# Patient Record
Sex: MALE | Race: BLACK OR AFRICAN AMERICAN | NOT HISPANIC OR LATINO | URBAN - METROPOLITAN AREA
[De-identification: names, ages, dates, MRNs, and addresses within clinical notes are randomized per-mention and may not be internally consistent; named-entity substitution may affect disease eponyms.]

---

## 2017-07-05 ENCOUNTER — EMERGENCY (EMERGENCY)
Facility: HOSPITAL | Age: 27
LOS: 1 days | Discharge: HOME | End: 2017-07-05
Attending: EMERGENCY MEDICINE | Admitting: EMERGENCY MEDICINE
Payer: SELF-PAY

## 2017-07-05 VITALS
OXYGEN SATURATION: 100 % | HEART RATE: 64 BPM | DIASTOLIC BLOOD PRESSURE: 91 MMHG | SYSTOLIC BLOOD PRESSURE: 142 MMHG | TEMPERATURE: 98 F | RESPIRATION RATE: 17 BRPM

## 2017-07-05 VITALS
TEMPERATURE: 99 F | SYSTOLIC BLOOD PRESSURE: 12 MMHG | HEART RATE: 78 BPM | OXYGEN SATURATION: 99 % | DIASTOLIC BLOOD PRESSURE: 76 MMHG | RESPIRATION RATE: 16 BRPM

## 2017-07-05 LAB
ALBUMIN SERPL ELPH-MCNC: 4.8 G/DL — SIGNIFICANT CHANGE UP (ref 3.3–5)
ALP SERPL-CCNC: 72 U/L — SIGNIFICANT CHANGE UP (ref 40–120)
ALT FLD-CCNC: 25 U/L RC — SIGNIFICANT CHANGE UP (ref 10–45)
ANION GAP SERPL CALC-SCNC: 14 MMOL/L — SIGNIFICANT CHANGE UP (ref 5–17)
APPEARANCE UR: CLEAR — SIGNIFICANT CHANGE UP
AST SERPL-CCNC: 22 U/L — SIGNIFICANT CHANGE UP (ref 10–40)
BACTERIA # UR AUTO: ABNORMAL /HPF
BASOPHILS # BLD AUTO: 0.1 K/UL — SIGNIFICANT CHANGE UP (ref 0–0.2)
BASOPHILS NFR BLD AUTO: 1.2 % — SIGNIFICANT CHANGE UP (ref 0–2)
BILIRUB SERPL-MCNC: 1 MG/DL — SIGNIFICANT CHANGE UP (ref 0.2–1.2)
BILIRUB UR-MCNC: NEGATIVE — SIGNIFICANT CHANGE UP
BUN SERPL-MCNC: 13 MG/DL — SIGNIFICANT CHANGE UP (ref 7–23)
CALCIUM SERPL-MCNC: 8.9 MG/DL — SIGNIFICANT CHANGE UP (ref 8.4–10.5)
CHLORIDE SERPL-SCNC: 105 MMOL/L — SIGNIFICANT CHANGE UP (ref 96–108)
CO2 SERPL-SCNC: 23 MMOL/L — SIGNIFICANT CHANGE UP (ref 22–31)
COLOR SPEC: YELLOW — SIGNIFICANT CHANGE UP
CREAT SERPL-MCNC: 0.94 MG/DL — SIGNIFICANT CHANGE UP (ref 0.5–1.3)
DIFF PNL FLD: NEGATIVE — SIGNIFICANT CHANGE UP
EOSINOPHIL # BLD AUTO: 0 K/UL — SIGNIFICANT CHANGE UP (ref 0–0.5)
EOSINOPHIL NFR BLD AUTO: 0.4 % — SIGNIFICANT CHANGE UP (ref 0–6)
GLUCOSE SERPL-MCNC: 88 MG/DL — SIGNIFICANT CHANGE UP (ref 70–99)
GLUCOSE UR QL: NEGATIVE — SIGNIFICANT CHANGE UP
HCT VFR BLD CALC: 45.9 % — SIGNIFICANT CHANGE UP (ref 39–50)
HGB BLD-MCNC: 15.9 G/DL — SIGNIFICANT CHANGE UP (ref 13–17)
HIV 1 & 2 AB SERPL IA.RAPID: SIGNIFICANT CHANGE UP
KETONES UR-MCNC: NEGATIVE — SIGNIFICANT CHANGE UP
LEUKOCYTE ESTERASE UR-ACNC: NEGATIVE — SIGNIFICANT CHANGE UP
LIDOCAIN IGE QN: 35 U/L — SIGNIFICANT CHANGE UP (ref 7–60)
LYMPHOCYTES # BLD AUTO: 2.1 K/UL — SIGNIFICANT CHANGE UP (ref 1–3.3)
LYMPHOCYTES # BLD AUTO: 29.7 % — SIGNIFICANT CHANGE UP (ref 13–44)
MCHC RBC-ENTMCNC: 32.7 PG — SIGNIFICANT CHANGE UP (ref 27–34)
MCHC RBC-ENTMCNC: 34.6 GM/DL — SIGNIFICANT CHANGE UP (ref 32–36)
MCV RBC AUTO: 94.3 FL — SIGNIFICANT CHANGE UP (ref 80–100)
MONOCYTES # BLD AUTO: 0.4 K/UL — SIGNIFICANT CHANGE UP (ref 0–0.9)
MONOCYTES NFR BLD AUTO: 5.9 % — SIGNIFICANT CHANGE UP (ref 2–14)
NEUTROPHILS # BLD AUTO: 4.5 K/UL — SIGNIFICANT CHANGE UP (ref 1.8–7.4)
NEUTROPHILS NFR BLD AUTO: 62.8 % — SIGNIFICANT CHANGE UP (ref 43–77)
NITRITE UR-MCNC: NEGATIVE — SIGNIFICANT CHANGE UP
PH UR: 6.5 — SIGNIFICANT CHANGE UP (ref 5–8)
PLATELET # BLD AUTO: 140 K/UL — LOW (ref 150–400)
POTASSIUM SERPL-MCNC: 4 MMOL/L — SIGNIFICANT CHANGE UP (ref 3.5–5.3)
POTASSIUM SERPL-SCNC: 4 MMOL/L — SIGNIFICANT CHANGE UP (ref 3.5–5.3)
PROT SERPL-MCNC: 7.4 G/DL — SIGNIFICANT CHANGE UP (ref 6–8.3)
PROT UR-MCNC: NEGATIVE — SIGNIFICANT CHANGE UP
RBC # BLD: 4.87 M/UL — SIGNIFICANT CHANGE UP (ref 4.2–5.8)
RBC # FLD: 12 % — SIGNIFICANT CHANGE UP (ref 10.3–14.5)
RBC CASTS # UR COMP ASSIST: SIGNIFICANT CHANGE UP /HPF (ref 0–2)
SODIUM SERPL-SCNC: 142 MMOL/L — SIGNIFICANT CHANGE UP (ref 135–145)
SP GR SPEC: 1.02 — SIGNIFICANT CHANGE UP (ref 1.01–1.02)
UROBILINOGEN FLD QL: NEGATIVE — SIGNIFICANT CHANGE UP
WBC # BLD: 7.2 K/UL — SIGNIFICANT CHANGE UP (ref 3.8–10.5)
WBC # FLD AUTO: 7.2 K/UL — SIGNIFICANT CHANGE UP (ref 3.8–10.5)
WBC UR QL: SIGNIFICANT CHANGE UP /HPF (ref 0–5)

## 2017-07-05 PROCEDURE — 99285 EMERGENCY DEPT VISIT HI MDM: CPT

## 2017-07-05 PROCEDURE — 74176 CT ABD & PELVIS W/O CONTRAST: CPT

## 2017-07-05 PROCEDURE — 85027 COMPLETE CBC AUTOMATED: CPT

## 2017-07-05 PROCEDURE — 81001 URINALYSIS AUTO W/SCOPE: CPT

## 2017-07-05 PROCEDURE — 71046 X-RAY EXAM CHEST 2 VIEWS: CPT

## 2017-07-05 PROCEDURE — 83690 ASSAY OF LIPASE: CPT

## 2017-07-05 PROCEDURE — 80053 COMPREHEN METABOLIC PANEL: CPT

## 2017-07-05 PROCEDURE — 74176 CT ABD & PELVIS W/O CONTRAST: CPT | Mod: 26

## 2017-07-05 PROCEDURE — 71020: CPT | Mod: 26

## 2017-07-05 PROCEDURE — 76705 ECHO EXAM OF ABDOMEN: CPT

## 2017-07-05 PROCEDURE — 86703 HIV-1/HIV-2 1 RESULT ANTBDY: CPT

## 2017-07-05 PROCEDURE — 99284 EMERGENCY DEPT VISIT MOD MDM: CPT | Mod: 25

## 2017-07-05 PROCEDURE — 76705 ECHO EXAM OF ABDOMEN: CPT | Mod: 26,RT

## 2017-07-05 PROCEDURE — 96374 THER/PROPH/DIAG INJ IV PUSH: CPT

## 2017-07-05 RX ORDER — ACETAMINOPHEN 500 MG
1000 TABLET ORAL ONCE
Qty: 0 | Refills: 0 | Status: COMPLETED | OUTPATIENT
Start: 2017-07-05 | End: 2017-07-05

## 2017-07-05 RX ORDER — SODIUM CHLORIDE 9 MG/ML
1000 INJECTION INTRAMUSCULAR; INTRAVENOUS; SUBCUTANEOUS ONCE
Qty: 0 | Refills: 0 | Status: COMPLETED | OUTPATIENT
Start: 2017-07-05 | End: 2017-07-05

## 2017-07-05 RX ADMIN — Medication 400 MILLIGRAM(S): at 15:16

## 2017-07-05 RX ADMIN — SODIUM CHLORIDE 2000 MILLILITER(S): 9 INJECTION INTRAMUSCULAR; INTRAVENOUS; SUBCUTANEOUS at 15:16

## 2017-07-05 NOTE — ED PROVIDER NOTE - PHYSICAL EXAMINATION
Resident: Gen: well appearing, of stated age, no acute distress; ENT: PERRL, MMM, no uvular deviation, no tonsilar erythema; Chest: CTAB, no retractions, rate normal, appears to breathe comfortably; Heart: RRR S1S2 No peripheral edema b/l pulses 2+ in arms and legs; Abd: Soft non-tender, no rebound or guarding, + right CVAT; : no rash; Back: No spinal deformity; Ext: Moving all 4 extremities without obvious impairment to ROM, no obvious weakness; Neuro: fluid speech; Psych: No anxiety, depression or pressured speech noted; Skin: no urticaria, no diffuse rash.

## 2017-07-05 NOTE — ED PROVIDER NOTE - NS ED ROS FT
Constitutional: no fever, no chills.  Eyes: no visual changes.  ENMT: no sore throat.  CV: no chest pain.  Resp: no cough, no shortness of breath.  GI: +abdominal pain, +nausea, no vomiting, no diarrhea.  : no dysuria, no hematuria, no discharge.  MSK: +back pain, no neck pain.  Skin: no rashes.  Neuro: no headache, no loss of consciousness, no weakness, no numbness, no tingling.  Psych: no known mental health issues.  Endo: no diabetes, no thyroid trouble.

## 2017-07-05 NOTE — ED PROVIDER NOTE - MEDICAL DECISION MAKING DETAILS
Resident: 27y M with R lower back pain, abdominal cramping, nausea. +CVAT. Concern for nephrolithiasis vs MSK, less likely pyelonephritis, RLL pna, hepatitis. will check cbc, cmp, ua, HIV, cxr, CT noncon, fluids, pain control, likely dc home.

## 2017-07-05 NOTE — ED PROVIDER NOTE - PROGRESS NOTE DETAILS
Resident: Labs wnl. CT negative. US showed GB wall edema without evidence of cholecystitis. Patient feels better after IV tylenol and wants to go home. Will DC with primary care doctor follow-up, will give copies of all reports. Attending MD Miller.  Pt signed out to me in stable condition pending CT abd/pelvis, reasesss, sx control prob D/c, R sided flank pain radiates around side, poss stone, negative sommers's sign.  Pt's pain has resolved with tylenol. NO n/v/fevers currently.  CT/sono non-actionable.  Pt continues to have negative somemrs's sign, no elevation of LFT's.  Advised to follow-up with surgery for recurrent pain and to return to ED for any acutely new/worsening sxs including fevers/nausea/vomiting/worsening pain/yellowing of skin.  Pt verbalizes understanding of plan of care.

## 2017-07-05 NOTE — ED PROVIDER NOTE - OBJECTIVE STATEMENT
Resident: 27y M presents with right lower back pain x 7 days. Described as pressure/discomfort, constant, worse when laying flat or bending over, while sitting up patient has no pain. Today developed abdominal cramping, comes and goes, severe. +Nausea. Denies vomiting, diarrhea, hematuria. Denies hx nephrolithiasis. Hx chlamydia, treated 2000. Hx incarceration. 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

## 2017-07-05 NOTE — ED PROVIDER NOTE - PLAN OF CARE
1. back pain  2. take tylenol as directed as needed for back pain.  3. Follow-up with your primary care doctor in 24-48hrs. If your pain becomes worse, follow-up with surgery clinic - their number is [phone].  4. Return immediately to the emergency department if you develop worsening pain, start turning yellow, develop fever or chills, or other worsening or concerning symptoms.

## 2017-07-05 NOTE — ED PROVIDER NOTE - CARE PLAN
Principal Discharge DX:	Acute right-sided thoracic back pain Principal Discharge DX:	Acute right-sided thoracic back pain  Instructions for follow-up, activity and diet:	1. back pain  2. take tylenol as directed as needed for back pain.  3. Follow-up with your primary care doctor in 24-48hrs. If your pain becomes worse, follow-up with surgery clinic - their number is [phone].  4. Return immediately to the emergency department if you develop worsening pain, start turning yellow, develop fever or chills, or other worsening or concerning symptoms. Principal Discharge DX:	Acute right-sided thoracic back pain  Instructions for follow-up, activity and diet:	1. back pain  2. take tylenol as directed as needed for back pain.  3. Follow-up with your primary care doctor in 24-48hrs. If your pain becomes worse, follow-up with surgery clinic - their number is [phone].  4. Return immediately to the emergency department if you develop worsening pain, start turning yellow, develop fever or chills, or other worsening or concerning symptoms.

## 2017-07-05 NOTE — ED ADULT NURSE NOTE - CHPI ED SYMPTOMS NEG
no dysuria/no diarrhea/no burning urination/no blood in stool/no fever/no nausea/no chills/no vomiting/no abdominal distension/no hematuria

## 2017-07-05 NOTE — ED PROVIDER NOTE - ATTENDING CONTRIBUTION TO CARE
27M presents with right flank pain. Pain started 3 days ago comes and goes radiates around side. No fevers. + nausea and vomiting this morning. no dysuria. 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 pt with + right cvat.   Constitutional: No fever or chills  Eyes: No visual changes  HEENT: No throat pain  CV: No chest pain  Resp: No SOB no cough  GI: + abd pain, + nausea and vomiting  : No dysuria  MSK: + back pain  Skin: No rash  Neuro: No headache    Constitutional: mild distress AAOx3  Eyes: PERRLA EOMI  Head: Normocephalic atraumatic  Mouth: MMM  Cardiac: regular rate   Resp: Lungs CTAB  GI: Abd s/nt/nd right cvat  Neuro: CN2-12 intact  Skin: No rashes 27M presents with right flank pain. Pain started 3 days ago comes and goes radiates around side. No fevers. + nausea and vomiting this morning. no dysuria. 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 pt with + right cvat.   Constitutional: No fever or chills  Eyes: No visual changes  HEENT: No throat pain  CV: No chest pain  Resp: No SOB no cough  GI: + abd pain, + nausea and vomiting  : No dysuria  MSK: + back pain  Skin: No rash  Neuro: No headache    Constitutional: mild distress AAOx3  Eyes: PERRLA EOMI  Head: Normocephalic atraumatic  Mouth: MMM  Cardiac: regular rate   Resp: Lungs CTAB  GI: Abd s/nt/nd right cvat negative sommers.   Neuro: CN2-12 intact  Skin: No rashes    patient with likely msk vs stone pain.  will obtain labs xray ekg ct abd urine and reassess

## 2017-07-05 NOTE — ED ADULT NURSE NOTE - OBJECTIVE STATEMENT
27 year old male presented to ED with c/o of right flank abdominal pain for the past week. Pt stated the pain moved to his lower right abdomen 5 minutes ago. Pt denies the pain radiating anywhere. Pt denies CP, N/V, numbness/tingling, fever, cough, chills. Pt denies hx of kidney stones. Pt denies burning/pain on urination. Pt has regular BM and voiding. Pt a&ox3, lungs clear, heart rate regular. abdomen soft nontender nondistended. Skin intact. IV in right medial side 20G and patent. Pt currently resting in bed with MD at bedside.

## 2019-03-10 ENCOUNTER — EMERGENCY (EMERGENCY)
Facility: HOSPITAL | Age: 29
LOS: 1 days | Discharge: HOME | End: 2019-03-10
Attending: EMERGENCY MEDICINE
Payer: SELF-PAY

## 2019-03-10 VITALS
RESPIRATION RATE: 18 BRPM | HEART RATE: 83 BPM | TEMPERATURE: 98 F | DIASTOLIC BLOOD PRESSURE: 79 MMHG | WEIGHT: 154.98 LBS | HEIGHT: 71 IN | OXYGEN SATURATION: 97 % | SYSTOLIC BLOOD PRESSURE: 120 MMHG

## 2019-03-10 PROCEDURE — 99283 EMERGENCY DEPT VISIT LOW MDM: CPT

## 2019-03-10 PROCEDURE — 99284 EMERGENCY DEPT VISIT MOD MDM: CPT

## 2019-03-10 RX ORDER — OXYCODONE HYDROCHLORIDE 5 MG/1
1 TABLET ORAL
Qty: 8 | Refills: 0 | OUTPATIENT
Start: 2019-03-10 | End: 2019-03-11

## 2019-03-10 RX ORDER — OXYCODONE HYDROCHLORIDE 5 MG/1
5 TABLET ORAL ONCE
Qty: 0 | Refills: 0 | Status: DISCONTINUED | OUTPATIENT
Start: 2019-03-10 | End: 2019-03-10

## 2019-03-10 RX ORDER — IBUPROFEN 200 MG
600 TABLET ORAL ONCE
Qty: 0 | Refills: 0 | Status: COMPLETED | OUTPATIENT
Start: 2019-03-10 | End: 2019-03-10

## 2019-03-10 RX ORDER — ACETAMINOPHEN 500 MG
975 TABLET ORAL ONCE
Qty: 0 | Refills: 0 | Status: COMPLETED | OUTPATIENT
Start: 2019-03-10 | End: 2019-03-10

## 2019-03-10 RX ORDER — CHLORHEXIDINE GLUCONATE 213 G/1000ML
15 SOLUTION TOPICAL
Qty: 300 | Refills: 0 | OUTPATIENT
Start: 2019-03-10 | End: 2019-03-19

## 2019-03-10 RX ADMIN — OXYCODONE HYDROCHLORIDE 5 MILLIGRAM(S): 5 TABLET ORAL at 13:47

## 2019-03-10 RX ADMIN — Medication 975 MILLIGRAM(S): at 13:47

## 2019-03-10 RX ADMIN — OXYCODONE HYDROCHLORIDE 5 MILLIGRAM(S): 5 TABLET ORAL at 13:46

## 2019-03-10 RX ADMIN — Medication 600 MILLIGRAM(S): at 13:47

## 2019-03-10 RX ADMIN — Medication 1 TABLET(S): at 15:20

## 2019-03-10 NOTE — ED PROVIDER NOTE - PROGRESS NOTE DETAILS
Pt's evaluated by dental and recommended to f/u his oral surgeon or clinic. D/C with pain med, Augmentin and Peridex. Pt stated feeling better after meds.

## 2019-03-10 NOTE — ED ADULT NURSE NOTE - OBJECTIVE STATEMENT
28 yr old male came in with tooth pain for the past few days. on assessment a and o x 3 lungs clear abd soft non tender no swelling in extremities no n/v/d/ no fevers. no signs of infection. no other complaints.

## 2019-03-10 NOTE — ED PROVIDER NOTE - PHYSICAL EXAMINATION
NAD, VSS, Afebrile, No obvious facial swelling. + Fluctuating swelling on the gum of left lower wisdom tooth area with tenderness. Normal ears and throat. Neuro- intact.

## 2019-03-10 NOTE — ED PROVIDER NOTE - OBJECTIVE STATEMENT
27yo male pt, no PMHx, ambulatory c/o left lower dental pain for 2days. Pt stated he had mild dental pain, left lower wisdom tooth area, 2days ago and it's gradually worsen since last night. He also noticed swelling to pain area and took Motrin without improvement. Denies fever, chills or recent sickness. Denies dizziness or N/V. Denies sensory changes or weakness to extremities. Denies CP/SOB/ABD pain or N/V/D.

## 2019-03-10 NOTE — ED ADULT NURSE NOTE - NSIMPLEMENTINTERV_GEN_ALL_ED
Implemented All Universal Safety Interventions:  [place] to call system. Call bell, personal items and telephone within reach. Instruct patient to call for assistance. Room bathroom lighting operational. Non-slip footwear when patient is off stretcher. Physically safe environment: no spills, clutter or unnecessary equipment. Stretcher in lowest position, wheels locked, appropriate side rails in place.

## 2019-03-10 NOTE — ED PROVIDER NOTE - NSFOLLOWUPCLINICS_GEN_ALL_ED_FT
[facility]  Dental  [address] [place] [id number]  Phone: [phone]  Fax:   Follow Up Time:

## 2019-03-10 NOTE — ED PROVIDER NOTE - NSFOLLOWUPINSTRUCTIONS_ED_ALL_ED_FT
Eat soft diet.  Augmentin 1 tablet every 12 hours.  Peridex mouth wash as directed.  Motrin (Advil or Ibuprofen) 600mg every 8hours for pain with food.  Tylenol 500mg or 650mg every 6hours for pain as needed.  Oxycodone 1 tablet every 6hours for severe pain with cautions of drowsiness and constipation.  Stool softener as needed.  Follow up with your oral surgeon or dental clinic [phone] in 2days, call tomorrow for an appointment.  Return for any concerns or worsening symptoms.

## 2019-03-10 NOTE — ED PROVIDER NOTE - ATTENDING CONTRIBUTION TO CARE
I performed a history and physical exam of the patient and discussed their management with the Advanced Care Practitioner. I reviewed the ACP's note and agree with the documented findings and plan of care, except if noted below. My medical decision making and observations are found below.    27 yo male presents with left lower molar swelling and pain. On exam, gum swelling with fluctuance. Concern for dental abscess. Will treat pain and consult dental for possible drainage.

## 2019-03-10 NOTE — CONSULT NOTE ADULT - SUBJECTIVE AND OBJECTIVE BOX
Patient is a 28y old  Male who presents with a chief complaint of lower left wisdom tooth pain    HPI: 27yo male pt, no PMHx, ambulatory c/o left lower dental pain for 2days. Pt stated he had mild dental pain, left lower wisdom tooth area, 2days ago and it's gradually worsen since last night. He also noticed swelling to pain area and took Motrin without improvement. Denies fever, chills or recent sickness. Denies dizziness or N/V. Denies sensory changes or weakness to extremities. Denies CP/SOB/ABD pain or N/V/D.        PAST MEDICAL & SURGICAL HISTORY:  No pertinent past medical history  No significant past surgical history      MEDICATIONS  (PRN): as given by med team  AUGMENTIN 875 mG/125 mG  acetaminophen Tablet 975mg  ibuprofen  Tablet 600mg  oxyCODONE    IR 5mg      Allergies    No Known Allergies    Intolerances    Vital Signs Last 24 Hrs  T(C): 36.8 (10 Mar 2019 12:29), Max: 36.8 (10 Mar 2019 12:29)  T(F): 98.2 (10 Mar 2019 12:29), Max: 98.2 (10 Mar 2019 12:29)  HR: 83 (10 Mar 2019 12:29) (83 - 83)  BP: 120/79 (10 Mar 2019 12:29) (120/79 - 120/79)  BP(mean): --  RR: 18 (10 Mar 2019 12:29) (18 - 18)  SpO2: 97% (10 Mar 2019 12:29) (97% - 97%)    EOE:  TMJ ( -  ) clicks                    ( -   ) pops                    ( -   ) crepitus             Mandible FROM             Facial bones and MOM grossly intact             ( -  ) trismus             ( -  ) LAD             ( -  ) swelling             ( -  ) asymmetry             ( -  ) palpation             ( -  ) SOB             ( -  ) dysphagia             ( -  ) LOC    IOE:  permanent dentition: grossly intact            tongue/FOM WNL           labial/buccal mucosa WNL. Buccal mucosa had white keratosis and linea alba.            ( -  ) percussion           ( +  ) palpation: pain on palpation to the gingival tissue covering and distal to tooth #17 (lower left third molar)           ( -  ) swelling            ( -  ) abscess           ( -  ) sinus tract    Pericoronitis noted on tooth #17 (lower left third molar). Gingival tissue covering and distal to tooth #17 was erythematous and inflamed and tender to palpation. Tooth #32 (lower right third molar) had slight erythema and inflammation of the gingival tissue over tooth #32 and distal to the tooth as well consistent with the beginning of pericoronitis. No pain on palpation in the area of tooth #32. No swelling, abscess, purulence or fistula noted intraorally or extraorally.    *DENTAL RADIOGRAPHS: 1 panoramic radiograph taken and reviewed. No caries noted. No abscess noted radiographically. Pericoronitis of tooth #17 noted.    *ASSESSMENT: Pericoronitis noted on tooth #17 (lower left third molar). Gingival tissue covering and distal to tooth #17 was erythematous and inflamed and tender to palpation. Tooth #32 (lower right third molar) had slight erythema and inflammation of the gingival tissue over tooth #32 and distal to the tooth as well consistent with the beginning of pericoronitis. No pain on palpation in the area of tooth #32. No swelling, abscess, purulence or fistula noted intraorally or extraorally.  Recommend: antibiotics as per med team, pain management as per med team, Peridex rinse, salt water rinses, and f/u with outpatient dentist for extraction of mandibular third molars.    PROCEDURE:   Verbal consent given.  Treatment: Intraoral and extraoral clinical exam, 1 panoramic radiograph taken and reviewed.    RECOMMENDATIONS:  1) Antibiotics as per med team, pain management as per med team, Peridex rinse, salt water rinses.  2) Dental F/U with outpatient dentist for comprehensive dental care.   3) If any difficulty swallowing/breathing, fever occur, return to ER.     Sara Catherine DDS, Pager # [phone] Patient is a 28y old  Male who presents with a chief complaint of lower left wisdom tooth pain    HPI: 27yo male pt, no PMHx, ambulatory c/o left lower dental pain for 2days. Pt stated he had mild dental pain, left lower wisdom tooth area, 2days ago and it's gradually worsen since last night. He also noticed swelling to pain area and took Motrin without improvement. Denies fever, chills or recent sickness. Denies dizziness or N/V. Denies sensory changes or weakness to extremities. Denies CP/SOB/ABD pain or N/V/D.        PAST MEDICAL & SURGICAL HISTORY:  No pertinent past medical history  No significant past surgical history      MEDICATIONS  (PRN): as given by med team  AUGMENTIN 875 mG/125 mG  acetaminophen Tablet 975mg  ibuprofen  Tablet 600mg  oxyCODONE    IR 5mg      Allergies    No Known Allergies    Intolerances    Vital Signs Last 24 Hrs  T(C): 36.8 (10 Mar 2019 12:29), Max: 36.8 (10 Mar 2019 12:29)  T(F): 98.2 (10 Mar 2019 12:29), Max: 98.2 (10 Mar 2019 12:29)  HR: 83 (10 Mar 2019 12:29) (83 - 83)  BP: 120/79 (10 Mar 2019 12:29) (120/79 - 120/79)  BP(mean): --  RR: 18 (10 Mar 2019 12:29) (18 - 18)  SpO2: 97% (10 Mar 2019 12:29) (97% - 97%)    EOE:  TMJ ( -  ) clicks                    ( -   ) pops                    ( -   ) crepitus             Mandible FROM             Facial bones and MOM grossly intact             ( -  ) trismus             ( -  ) LAD             ( -  ) swelling             ( -  ) asymmetry             ( -  ) palpation             ( -  ) SOB             ( -  ) dysphagia             ( -  ) LOC    IOE:  permanent dentition: grossly intact            tongue/FOM WNL           labial/buccal mucosa WNL. Buccal mucosa had white keratosis and linea alba.            ( -  ) percussion           ( +  ) palpation: pain on palpation to the gingival tissue covering and distal to tooth #17 (lower left third molar)           ( -  ) swelling            ( -  ) abscess           ( -  ) sinus tract    Pericoronitis noted on tooth #17 (lower left third molar). Gingival tissue covering and distal to tooth #17 was erythematous and inflamed and tender to palpation. Tooth #32 (lower right third molar) had slight erythema and inflammation of the gingival tissue over tooth #32 and distal to the tooth as well consistent with the beginning of pericoronitis. No pain on palpation in the area of tooth #32. No swelling, abscess, purulence or fistula noted intraorally or extraorally.    *DENTAL RADIOGRAPHS: 1 panoramic radiograph taken and reviewed. No caries noted. No abscess noted radiographically. Pericoronitis of tooth #17 noted.    *ASSESSMENT: Pericoronitis noted on tooth #17 (lower left third molar). Gingival tissue covering and distal to tooth #17 was erythematous and inflamed and tender to palpation. Tooth #32 (lower right third molar) had slight erythema and inflammation of the gingival tissue over tooth #32 and distal to the tooth as well consistent with the beginning of pericoronitis. No pain on palpation in the area of tooth #32. No swelling, abscess, purulence or fistula noted intraorally or extraorally.  1 panoramic radiograph taken and reviewed. No caries noted. No abscess noted radiographically. Pericoronitis of tooth #17 noted.  No acute infection noted.  Recommend: antibiotics as per med team, pain management as per med team, Peridex rinse, salt water rinses, and f/u with outpatient dentist for extraction of mandibular third molars.    PROCEDURE:   Verbal consent given.  Treatment: Intraoral and extraoral clinical exam, 1 panoramic radiograph taken and reviewed.    RECOMMENDATIONS:  1) Antibiotics as per med team, pain management as per med team, Peridex rinse, salt water rinses.  2) Dental F/U with outpatient dentist for comprehensive dental care.   3) If any difficulty swallowing/breathing, fever occur, return to ER.     Sara Catherine DDS, Pager # [phone]